# Patient Record
Sex: FEMALE | Race: OTHER | HISPANIC OR LATINO | URBAN - METROPOLITAN AREA
[De-identification: names, ages, dates, MRNs, and addresses within clinical notes are randomized per-mention and may not be internally consistent; named-entity substitution may affect disease eponyms.]

---

## 2023-08-15 ENCOUNTER — EMERGENCY (EMERGENCY)
Facility: HOSPITAL | Age: 23
LOS: 1 days | Discharge: HOME | End: 2023-08-15
Attending: EMERGENCY MEDICINE
Payer: SELF-PAY

## 2023-08-15 VITALS
RESPIRATION RATE: 20 BRPM | DIASTOLIC BLOOD PRESSURE: 69 MMHG | HEIGHT: 62 IN | TEMPERATURE: 98 F | HEART RATE: 64 BPM | OXYGEN SATURATION: 98 % | SYSTOLIC BLOOD PRESSURE: 123 MMHG | WEIGHT: 149.91 LBS

## 2023-08-15 LAB
APPEARANCE UR: ABNORMAL
BACTERIA # UR AUTO: ABNORMAL
BILIRUB UR-MCNC: NEGATIVE — SIGNIFICANT CHANGE UP
COLOR SPEC: SIGNIFICANT CHANGE UP
DIFF PNL FLD: ABNORMAL
EPI CELLS # UR: 3 — SIGNIFICANT CHANGE UP
GLUCOSE UR QL: NEGATIVE — SIGNIFICANT CHANGE UP
HCG UR QL: NEGATIVE — SIGNIFICANT CHANGE UP
HYALINE CASTS # UR AUTO: 0 /LPF — SIGNIFICANT CHANGE UP (ref 0–7)
KETONES UR-MCNC: NEGATIVE — SIGNIFICANT CHANGE UP
LEUKOCYTE ESTERASE UR-ACNC: NEGATIVE — SIGNIFICANT CHANGE UP
NITRITE UR-MCNC: NEGATIVE — SIGNIFICANT CHANGE UP
PH UR: 6 — SIGNIFICANT CHANGE UP (ref 5–8)
PROT UR-MCNC: ABNORMAL
RBC CASTS # UR COMP ASSIST: >50 /HPF — HIGH (ref 0–4)
SP GR SPEC: 1.02 — SIGNIFICANT CHANGE UP (ref 1.01–1.02)
UROBILINOGEN FLD QL: NEGATIVE — SIGNIFICANT CHANGE UP
WBC UR QL: 15 /HPF — HIGH (ref 0–5)

## 2023-08-15 PROCEDURE — 99284 EMERGENCY DEPT VISIT MOD MDM: CPT

## 2023-08-15 PROCEDURE — 81001 URINALYSIS AUTO W/SCOPE: CPT

## 2023-08-15 PROCEDURE — 87077 CULTURE AEROBIC IDENTIFY: CPT

## 2023-08-15 PROCEDURE — 99283 EMERGENCY DEPT VISIT LOW MDM: CPT

## 2023-08-15 PROCEDURE — 87186 SC STD MICRODIL/AGAR DIL: CPT

## 2023-08-15 PROCEDURE — 81025 URINE PREGNANCY TEST: CPT

## 2023-08-15 PROCEDURE — 87086 URINE CULTURE/COLONY COUNT: CPT

## 2023-08-15 RX ORDER — NITROFURANTOIN MACROCRYSTAL 50 MG
1 CAPSULE ORAL
Qty: 10 | Refills: 0
Start: 2023-08-15 | End: 2023-08-19

## 2023-08-15 RX ORDER — NITROFURANTOIN MACROCRYSTAL 50 MG
100 CAPSULE ORAL ONCE
Refills: 0 | Status: COMPLETED | OUTPATIENT
Start: 2023-08-15 | End: 2023-08-15

## 2023-08-15 RX ORDER — IBUPROFEN 200 MG
600 TABLET ORAL ONCE
Refills: 0 | Status: COMPLETED | OUTPATIENT
Start: 2023-08-15 | End: 2023-08-15

## 2023-08-15 RX ADMIN — Medication 100 MILLIGRAM(S): at 11:19

## 2023-08-15 RX ADMIN — Medication 600 MILLIGRAM(S): at 09:34

## 2023-08-15 NOTE — ED ADULT TRIAGE NOTE - AS PAIN REST
NRB mask applied.      Austin Durham RN  07/08/18 1848 0 (no pain/absence of nonverbal indicators of pain)

## 2023-08-15 NOTE — ED PROVIDER NOTE - NSFOLLOWUPINSTRUCTIONS_ED_ALL_ED_FT
See your Primary Doctor this week for follow up -- call to discuss.    Use Acetaminophen and/or Ibuprofen as directed for pain/fever -- see medication warnings.    Use NITROFURANTOIN as directed for urinary infection -- see medication warnings.    See URINARY TRACT INFECTION information and return instructions given to you.    Seek immediate medical care for new/worsening symptoms/concerns.

## 2023-08-15 NOTE — ED PROVIDER NOTE - PHYSICAL EXAMINATION
Vitals: I have reviewed the patients vital signs  General: Well dressed, well appearing, no acute distress  HEENT: Atraumatic, normocephalic, airway patent  Eyes: EOMI, tracking appropriately  Neck: no tracheal deviation, no JVD  Chest/Lungs: symmetric chest rise, speaking in complete sentences, CTA BL   Heart: skin and extremities well perfused, regular rate and rhythm  Abdomen: soft, nontender and nondistended, no flank tenderness   Neuro: A+Ox3, ambulating without difficulty, CN grossly intact  MSK: strength at baseline in all extremities, no muscle wasting or atrophy  Skin: no cyanosis, no jaundice, no new emergent lesions

## 2023-08-15 NOTE — ED PROVIDER NOTE - ATTENDING CONTRIBUTION TO CARE
------------ATTENDING NOTE------------  pt c/o < 24 hrs of increased urinary frequency/urgency and mild burning dysuria and noticing blood tinged urine, no fevers, no flank/back pain, no vaginal discharge, LMP just ended, no STI risks, overall well appearing, nontoxic, soft benign abd, no flank tenderness, awaiting UA -->  - Scott Cooney MD   ----------------------------------------------

## 2023-08-15 NOTE — ED PROVIDER NOTE - CLINICAL SUMMARY MEDICAL DECISION MAKING FREE TEXT BOX
see MD Note see MD Note    Andrew PGY3   Patient is a 22-year-old female with no past medical history or surgical history presents with acute onset of dysuria and hematuria. Patient has no risk factors for high risk sexual behavior. Likely acute cystitis. Low suspicion for pyelo given no fever/chills/CVA tenderness. Will send for UA/UC and Upreg.

## 2023-08-15 NOTE — ED PROVIDER NOTE - OBJECTIVE STATEMENT
Patient is a 22-year-old female with no past medical history or surgical history presents with acute onset of dysuria and hematuria.  Patient states that when she went to urinate this morning she started having burning sensation and noted blood-tinged urine in the toilet bowl. Currently endorses feeling little nauseous with some suprapubic discomfort.  Denies fever, chills, vomiting, abdominal pain, flank pain or any recent illness.  Denies high risk sexual behaviors. Finished her menstrual period yesterday. No prior history of kidney stone or UTI.

## 2023-08-15 NOTE — ED PROVIDER NOTE - PROGRESS NOTE DETAILS
Andrew PGY3  UA concerning for cystitis, will give macrobid and send home with macrobid. Upreg negative.

## 2023-08-15 NOTE — ED PROVIDER NOTE - PATIENT PORTAL LINK FT
You can access the FollowMyHealth Patient Portal offered by [facility] by registering at the following website: [email or web address]. By joining [employer]’s FollowMyHealth portal, you will also be able to view your health information using other applications (apps) compatible with our system.

## 2023-08-15 NOTE — ED PROVIDER NOTE - CROS ED ROS STATEMENT
all other ROS negative except as per HPI Oxybutynin Counseling:  I discussed with the patient the risks of oxybutynin including but not limited to skin rash, drowsiness, dry mouth, difficulty urinating, and blurred vision.

## 2023-08-15 NOTE — ED ADULT NURSE NOTE - OBJECTIVE STATEMENT
23 yo presents to the ED from home. A&Ox4, ambulatory c/o hematuria. pt reports painful urination and blood in urine since this AM. denies flank or abd pain. pt reports nausea, denies vomiting, diarrhea. denies fever, chills. MD at bedside for eval. plan of care discussed. safety and comfort measures maintained.

## 2023-08-18 RX ORDER — CEFDINIR 250 MG/5ML
1 POWDER, FOR SUSPENSION ORAL
Qty: 14 | Refills: 0
Start: 2023-08-18 | End: 2023-08-24

## 2023-08-18 NOTE — ED POST DISCHARGE NOTE - DETAILS
8/18/23 Armaan PA-pt is on Macrobid- indeterminate based on sensitivities. called patient to inform them of result, no answer, left v/m to call back admin line 8/18/23 Armaan ORTIZ pt called back. pt states she does feel nauseous today and has back pain. pt switched to cefdinir BID x 7 days. pt instructed that if she develops upper back pain, fevers, or any worsening then she should come back to the hospital. pt understands and agrees with plan. cefdinir sent to her pharmacy.

## 2023-09-25 PROBLEM — Z00.00 ENCOUNTER FOR PREVENTIVE HEALTH EXAMINATION: Status: ACTIVE | Noted: 2023-09-25

## 2023-10-20 ENCOUNTER — APPOINTMENT (OUTPATIENT)
Dept: INTERNAL MEDICINE | Facility: CLINIC | Age: 23
End: 2023-10-20

## 2023-11-02 NOTE — ED PROVIDER NOTE - CHIEF COMPLAINT
Seen on 10/19/2023 and the results were given to the pt    Follow-up        Abd pain is a little better but he still has diarrhea. He said that the GI office called him and that he will call them back          Praneeth Abbasi APRN - NP looked over your labs     You have a gall stone if it is causing you so many issues, I can refer to general surgery. You also have fatty liver and this can be changed by a healthy diet and exercise regimen   You also kidney stone measuring 6mm. Please let me know if you would like referrals to general surgery for the gallbladder and urology for the kidney stone.
The patient is a 22y Female complaining of hematuria.

## 2024-01-24 ENCOUNTER — APPOINTMENT (OUTPATIENT)
Dept: PODIATRY | Facility: CLINIC | Age: 24
End: 2024-01-24
Payer: OTHER GOVERNMENT

## 2024-01-24 DIAGNOSIS — F17.210 NICOTINE DEPENDENCE, CIGARETTES, UNCOMPLICATED: ICD-10-CM

## 2024-01-24 DIAGNOSIS — L03.032 CELLULITIS OF LEFT TOE: ICD-10-CM

## 2024-01-24 DIAGNOSIS — M79.675 PAIN IN LEFT TOE(S): ICD-10-CM

## 2024-01-24 DIAGNOSIS — L60.0 INGROWING NAIL: ICD-10-CM

## 2024-01-24 DIAGNOSIS — Z82.49 FAMILY HISTORY OF ISCHEMIC HEART DISEASE AND OTHER DISEASES OF THE CIRCULATORY SYSTEM: ICD-10-CM

## 2024-01-24 PROCEDURE — 10060 I&D ABSCESS SIMPLE/SINGLE: CPT | Mod: LT

## 2024-01-24 PROCEDURE — 99203 OFFICE O/P NEW LOW 30 MIN: CPT | Mod: 25

## 2024-01-26 PROBLEM — M79.675 PAIN OF TOE OF LEFT FOOT: Status: ACTIVE | Noted: 2024-01-25

## 2024-01-26 PROBLEM — L03.032 PARONYCHIA OF TOENAIL OF LEFT FOOT: Status: ACTIVE | Noted: 2024-01-25

## 2024-01-26 PROBLEM — L60.0 INGROWN NAIL: Status: ACTIVE | Noted: 2024-01-25

## 2024-01-26 NOTE — PHYSICAL EXAM
[Varicose Veins Of Lower Extremities] : not present [Ankle Swelling (On Exam)] : not present [FreeTextEntry3] : Hair growth noted on digits. Proximal to distal cooling is within normal limits.  [Delayed in the Right Toes] : capillary refills normal in right toes [Delayed in the Left Toes] : capillary refills normal in the left toes [FreeTextEntry1] : Left hallux fibular ingrown nail with paronychia. There is erythema and soupy, infectious drainage. It is very sensitive, 8/10 pain.

## 2024-01-26 NOTE — ASSESSMENT
[FreeTextEntry1] : Impression: Ingrown nail left hallux fibular border. Pain.  Treatment: The patient consented.  I prepped the area with alcohol. The patient was placed in the supine position where a digital block of 1% Xylocaine was administered to the left hallux consisting of 2cc, after prepping it with alcohol and using Ethyl Chloride.   A debridement tray was opened. The left hallux was then prepped and draped in the usual sterile manner.  Under strict sterile technique, the fibular border was freed from the surrounding nail folds.  Then utilizing an English anvil nail splitter, the nail border was removed straight back to and under the eponychium.  The remaining nail bed was inspected and noted to be free of any spicules.   Paronychia was incised and drained. I removed small granuloma. All infectious drainage was thoroughly drained. it bled well. It is nice and healthy and there is nothing to actually culture after the drainage.  Area was irrigated and cleansed with peroxide and saline.  A Neosporin dressing was applied.  Patient was given verbal and written soaking post-operative instructions.  She will use topical antibiosis. With any problem that persists she needs to immediately call the office.

## 2024-01-26 NOTE — HISTORY OF PRESENT ILLNESS
[FreeTextEntry1] : Patient presents today as a new patient because of an infected left hallux fibular ingrown nail with paronychia. It is very painful. Pain is 7/10. She can't even touch it herself. It is getting worse and going on for a week.

## 2024-05-24 ENCOUNTER — TRANSCRIPTION ENCOUNTER (OUTPATIENT)
Age: 24
End: 2024-05-24

## 2024-05-24 ENCOUNTER — APPOINTMENT (OUTPATIENT)
Dept: INTERNAL MEDICINE | Facility: CLINIC | Age: 24
End: 2024-05-24
Payer: OTHER GOVERNMENT

## 2024-05-24 VITALS
BODY MASS INDEX: 27.6 KG/M2 | WEIGHT: 150 LBS | HEART RATE: 67 BPM | TEMPERATURE: 98.4 F | RESPIRATION RATE: 14 BRPM | OXYGEN SATURATION: 97 % | SYSTOLIC BLOOD PRESSURE: 115 MMHG | DIASTOLIC BLOOD PRESSURE: 74 MMHG | HEIGHT: 62 IN

## 2024-05-24 DIAGNOSIS — Z00.00 ENCOUNTER FOR GENERAL ADULT MEDICAL EXAMINATION W/OUT ABNORMAL FINDINGS: ICD-10-CM

## 2024-05-24 DIAGNOSIS — Z82.61 FAMILY HISTORY OF ARTHRITIS: ICD-10-CM

## 2024-05-24 DIAGNOSIS — Z11.3 ENCOUNTER FOR SCREENING FOR INFECTIONS WITH A PREDOMINANTLY SEXUAL MODE OF TRANSMISSION: ICD-10-CM

## 2024-05-24 DIAGNOSIS — G43.909 MIGRAINE, UNSPECIFIED, NOT INTRACTABLE, W/OUT STATUS MIGRAINOSUS: ICD-10-CM

## 2024-05-24 DIAGNOSIS — Z82.0 FAMILY HISTORY OF EPILEPSY AND OTHER DISEASES OF THE NERVOUS SYSTEM: ICD-10-CM

## 2024-05-24 DIAGNOSIS — Z82.69 FAMILY HISTORY OF OTHER DISEASES OF THE MUSCULOSKELETAL SYSTEM AND CONNECTIVE TISSUE: ICD-10-CM

## 2024-05-24 DIAGNOSIS — N94.6 DYSMENORRHEA, UNSPECIFIED: ICD-10-CM

## 2024-05-24 DIAGNOSIS — Z82.49 FAMILY HISTORY OF ISCHEMIC HEART DISEASE AND OTHER DISEASES OF THE CIRCULATORY SYSTEM: ICD-10-CM

## 2024-05-24 PROCEDURE — G0444 DEPRESSION SCREEN ANNUAL: CPT | Mod: 59

## 2024-05-24 PROCEDURE — 99385 PREV VISIT NEW AGE 18-39: CPT

## 2024-05-24 RX ORDER — CALCIUM CARBONATE/VITAMIN D3 600 MG-10
TABLET ORAL
Refills: 0 | Status: ACTIVE | COMMUNITY

## 2024-05-24 RX ORDER — MAGNESIUM OXIDE/MAG AA CHELATE 300 MG
CAPSULE ORAL
Refills: 0 | Status: ACTIVE | COMMUNITY

## 2024-05-24 RX ORDER — MULTIVIT-MIN/IRON/FOLIC ACID/K 18-600-40
CAPSULE ORAL
Refills: 0 | Status: ACTIVE | COMMUNITY

## 2024-05-24 NOTE — PHYSICAL EXAM
[No Acute Distress] : no acute distress [Well Nourished] : well nourished [Well Developed] : well developed [Well-Appearing] : well-appearing [Normal Sclera/Conjunctiva] : normal sclera/conjunctiva [PERRL] : pupils equal round and reactive to light [EOMI] : extraocular movements intact [Normal Outer Ear/Nose] : the outer ears and nose were normal in appearance [Normal Oropharynx] : the oropharynx was normal [No JVD] : no jugular venous distention [No Lymphadenopathy] : no lymphadenopathy [Supple] : supple [Thyroid Normal, No Nodules] : the thyroid was normal and there were no nodules present [No Respiratory Distress] : no respiratory distress  [No Accessory Muscle Use] : no accessory muscle use [Clear to Auscultation] : lungs were clear to auscultation bilaterally [Normal Rate] : normal rate  [Regular Rhythm] : with a regular rhythm [Normal S1, S2] : normal S1 and S2 [Pedal Pulses Present] : the pedal pulses are present [No Edema] : there was no peripheral edema [Soft] : abdomen soft [Non Tender] : non-tender [Non-distended] : non-distended [No Masses] : no abdominal mass palpated [No HSM] : no HSM [Normal Bowel Sounds] : normal bowel sounds [Normal Posterior Cervical Nodes] : no posterior cervical lymphadenopathy [Normal Anterior Cervical Nodes] : no anterior cervical lymphadenopathy [No CVA Tenderness] : no CVA  tenderness [No Spinal Tenderness] : no spinal tenderness [No Joint Swelling] : no joint swelling [Grossly Normal Strength/Tone] : grossly normal strength/tone [No Rash] : no rash [Coordination Grossly Intact] : coordination grossly intact [No Focal Deficits] : no focal deficits [Normal Gait] : normal gait [Normal Affect] : the affect was normal [Normal Insight/Judgement] : insight and judgment were intact

## 2024-05-25 NOTE — ADDENDUM
[FreeTextEntry1] :   Documented by Boubacar Keane acting as a scribe for Dr. Denisse Jones. 05/24/2024   All medical record entries made by the Scribe were at my, Dr. Denisse Jones, direction and personally dictated by me on 05/24/2024. I have reviewed the chart and agree that the record accurately reflects my personal performance of the history, physical exam, assessment and plan. I have also personally directed, reviewed, and agreed with the chart.

## 2024-05-25 NOTE — HISTORY OF PRESENT ILLNESS
[de-identified] : Ms. LUIS CARLOS MON is a 23 year old female with no significant PMH presents to establish care and for a CPE.  Pt is c/o bleeding even after period has ended. She states that she constantly has spotting.  Pt is also concerned regarding a diagnosis of G6PD deficiency by the [employer], would like to have BW rechecked to see if it was an error.  Denies any exertional chest pain, dyspnea, palpitations, headaches, and dizziness.  Denies any F/C, N/V, leg swelling, or abdominal pain. She is otherwise fine and offers no additional complaints.

## 2024-05-25 NOTE — HEALTH RISK ASSESSMENT
[Fair] : ~his/her~ current health as fair  [Very Good] : ~his/her~  mood as very good [Yes] : Yes [0] : 2) Feeling down, depressed, or hopeless: Not at all (0) [PHQ-2 Negative - No further assessment needed] : PHQ-2 Negative - No further assessment needed [Patient reported PAP Smear was normal] : Patient reported PAP Smear was normal [Alone] : lives alone [# of Members in Household ___] :  household currently consist of [unfilled] member(s) [Employed] : employed [Student] : student [High School] : high school [Single] : single [# Of Children ___] : has [unfilled] children [Feels Safe at Home] : Feels safe at home [Never] : Never [de-identified] : Socially.  [de-identified] : Maintains active by running thirty minutes three times a week, exercising, calisthenics.  [PIM1Atval] : 0 [de-identified] : Admits her diet could be poor.  [de-identified] : Friend/roommate [PapSmearDate] : 2021 [de-identified] : Currently in collge

## 2024-05-31 ENCOUNTER — APPOINTMENT (OUTPATIENT)
Dept: INTERNAL MEDICINE | Facility: CLINIC | Age: 24
End: 2024-05-31
Payer: OTHER GOVERNMENT

## 2024-05-31 DIAGNOSIS — R79.89 OTHER SPECIFIED ABNORMAL FINDINGS OF BLOOD CHEMISTRY: ICD-10-CM

## 2024-05-31 LAB
25(OH)D3 SERPL-MCNC: 25.1 NG/ML
ALBUMIN SERPL ELPH-MCNC: 4.5 G/DL
ALP BLD-CCNC: 71 U/L
ALT SERPL-CCNC: 8 U/L
ANION GAP SERPL CALC-SCNC: 12 MMOL/L
APPEARANCE: CLEAR
AST SERPL-CCNC: 17 U/L
BILIRUB SERPL-MCNC: 0.9 MG/DL
BILIRUBIN URINE: NEGATIVE
BLOOD URINE: NEGATIVE
BUN SERPL-MCNC: 10 MG/DL
C TRACH RRNA SPEC QL NAA+PROBE: NOT DETECTED
CALCIUM SERPL-MCNC: 9.4 MG/DL
CHLORIDE SERPL-SCNC: 103 MMOL/L
CHOLEST SERPL-MCNC: 162 MG/DL
CO2 SERPL-SCNC: 24 MMOL/L
COLOR: YELLOW
CREAT SERPL-MCNC: 0.76 MG/DL
EGFR: 114 ML/MIN/1.73M2
G6PD SER-CCNC: 10.6 U/G HGB
GLUCOSE QUALITATIVE U: NEGATIVE MG/DL
GLUCOSE SERPL-MCNC: 74 MG/DL
HAV IGM SER QL: NONREACTIVE
HBV CORE IGM SER QL: NONREACTIVE
HBV SURFACE AB SER QL: ABNORMAL
HBV SURFACE AG SER QL: NONREACTIVE
HCV AB SER QL: NONREACTIVE
HCV S/CO RATIO: 0.1 S/CO
HDLC SERPL-MCNC: 60 MG/DL
HIV1+2 AB SPEC QL IA.RAPID: NONREACTIVE
HSV 1+2 IGG SER IA-IMP: NEGATIVE
HSV 1+2 IGG SER IA-IMP: NEGATIVE
HSV1 IGG SER QL: <0.01 INDEX
HSV2 IGG SER QL: 0.04 INDEX
KETONES URINE: NEGATIVE MG/DL
LDLC SERPL CALC-MCNC: 92 MG/DL
LEUKOCYTE ESTERASE URINE: NEGATIVE
N GONORRHOEA RRNA SPEC QL NAA+PROBE: NOT DETECTED
NITRITE URINE: NEGATIVE
NONHDLC SERPL-MCNC: 102 MG/DL
PH URINE: 6
POTASSIUM SERPL-SCNC: 4.1 MMOL/L
PROT SERPL-MCNC: 6.9 G/DL
PROTEIN URINE: NEGATIVE MG/DL
SODIUM SERPL-SCNC: 139 MMOL/L
SOURCE AMPLIFICATION: NORMAL
SOURCE AMPLIFICATION: NORMAL
SPECIFIC GRAVITY URINE: 1.03
T PALLIDUM AB SER QL IA: NEGATIVE
TRIGL SERPL-MCNC: 53 MG/DL
TSH SERPL-ACNC: 2.31 UIU/ML
UROBILINOGEN URINE: 1 MG/DL
VIT B12 SERPL-MCNC: 317 PG/ML

## 2024-05-31 PROCEDURE — 99213 OFFICE O/P EST LOW 20 MIN: CPT

## 2024-07-01 PROBLEM — R79.89 LOW VITAMIN D LEVEL: Status: ACTIVE | Noted: 2024-07-01

## 2024-07-01 NOTE — PLAN
[FreeTextEntry1] : See plan    Low Vit D Pt instructed to take OTC Vit D supplements  Lab results d/w pt

## 2024-07-01 NOTE — HISTORY OF PRESENT ILLNESS
[Home] : at home, [unfilled] , at the time of the visit. [Medical Office: ([facility])___] : at the medical office located in  [de-identified] : Ms. LUIS CARLOS MON is a 23 year old female with no significant PMH presents for a follow up via Telehealth.   Patient states that she is feeling well and is doing fine. She states that she has been trying to monitor her diet and exercise. Rceent bloodwork revealed low Vit D. Has not been taking OTC Vit D supplements. No fevers, h/a, hair loss, oral ulcers, epistaxis, sinusitis, swollen glands, dry eyes, CP, SOB, cough, vision changes, abdominal pain, GERD, n/v/d, blood in stool or urine, focal weakness, sensory loss, weight loss. She is otherwise fine and offers no additional complaints.

## 2024-08-29 ENCOUNTER — APPOINTMENT (OUTPATIENT)
Dept: PODIATRY | Facility: CLINIC | Age: 24
End: 2024-08-29

## 2024-11-18 ENCOUNTER — APPOINTMENT (OUTPATIENT)
Dept: PODIATRY | Facility: CLINIC | Age: 24
End: 2024-11-18

## 2025-02-22 ENCOUNTER — EMERGENCY (EMERGENCY)
Facility: HOSPITAL | Age: 25
LOS: 1 days | Discharge: HOME | End: 2025-02-22
Attending: STUDENT IN AN ORGANIZED HEALTH CARE EDUCATION/TRAINING PROGRAM
Payer: OTHER GOVERNMENT

## 2025-02-22 VITALS
HEART RATE: 61 BPM | DIASTOLIC BLOOD PRESSURE: 61 MMHG | OXYGEN SATURATION: 99 % | SYSTOLIC BLOOD PRESSURE: 110 MMHG | RESPIRATION RATE: 16 BRPM

## 2025-02-22 VITALS
RESPIRATION RATE: 16 BRPM | HEART RATE: 81 BPM | WEIGHT: 151.9 LBS | TEMPERATURE: 98 F | DIASTOLIC BLOOD PRESSURE: 80 MMHG | OXYGEN SATURATION: 97 % | SYSTOLIC BLOOD PRESSURE: 120 MMHG | HEIGHT: 62 IN

## 2025-02-22 PROCEDURE — 99284 EMERGENCY DEPT VISIT MOD MDM: CPT

## 2025-02-22 PROCEDURE — 99283 EMERGENCY DEPT VISIT LOW MDM: CPT

## 2025-02-22 PROCEDURE — 99053 MED SERV 10PM-8AM 24 HR FAC: CPT

## 2025-02-22 RX ORDER — IBUPROFEN 600 MG/1
400 TABLET, FILM COATED ORAL ONCE
Refills: 0 | Status: COMPLETED | OUTPATIENT
Start: 2025-02-22 | End: 2025-02-22

## 2025-02-22 RX ORDER — LIDOCAINE HYDROCHLORIDE 30 MG/G
1 CREAM TOPICAL ONCE
Refills: 0 | Status: COMPLETED | OUTPATIENT
Start: 2025-02-22 | End: 2025-02-22

## 2025-02-22 RX ADMIN — IBUPROFEN 400 MILLIGRAM(S): 600 TABLET, FILM COATED ORAL at 06:39

## 2025-02-22 RX ADMIN — LIDOCAINE HYDROCHLORIDE 1 PATCH: 30 CREAM TOPICAL at 06:39

## 2025-02-22 RX ADMIN — Medication 10 MILLIGRAM(S): at 06:39

## 2025-02-22 NOTE — ED PROVIDER NOTE - ATTENDING CONTRIBUTION TO CARE
I was the supervising attending. I have independently seen face-to-face and examined the patient with the resident. I have reviewed the history and physical and discussed the MDM with the resident. I agree with the assessment and plan as presented unless otherwise documented as follows:    24F no PMH presenting w/ back pain. Present since yesterday, states she was seated in a chair when she noted sudden onset of L lower back pain upon standing up. States pain radiates down L buttock and around L thigh. Has never had similar symptoms before. Endorses constant pain since onset that is progressively worsening, worse with certain movements e.g. fully bending over and improved by sitting/slightly leaning forward and lying down. Otherwise denies recent injuries/falls, heavy lifting/exertion, leg numbness/tingling, weakness, urinary retention/fecal incontinence, fevers/chills, nausea/vomiting, abdominal pain, chest pain, SOB. Appears well, AOx3, not in acute distress. Lungs CTABL, normal heart sounds, abdomen soft and NT/ND. Mild L paralumbar & buttock TTP, no midline TTP or step-off/deformity. No rash to back. Positive SLR L>R. 5/5 strength all 4 extremities w/ intact sensation. Exam most c/w MSK/sciatic back pain, presentation not suggestive of acute cord pathology. Will reassess after pain medication/muscle relaxant. -Stacie Cota MD (Attending)

## 2025-02-22 NOTE — ED PROVIDER NOTE - OBJECTIVE STATEMENT
24-year-old female with no significant past medical history presents emergency department due to 1 day of left-sided lower back pain.  Patient states she was sitting and then stood up and started having pain in her lower back that radiated down to her left leg.  Patient has not taken anything for the pain.  States that is difficult for her to bend and has worsening pain with different movements.  Says she cannot bend over fully.  Denies fever, chills, nausea, vomiting, chest pain, shortness of breath, abdominal pain, urinary frequency, dysuria, bowel incontinence, constipation, diarrhea.  No trauma or heavy lifting.

## 2025-02-22 NOTE — ED PROVIDER NOTE - PHYSICAL EXAMINATION
Gen: No acute distress  HEENT: EOMI, No midline tenderness.   CV: RRR, +S1/S2, no M/R/G, 2+ radial pulses b/l  Resp: CTAB, no W/R/R, no accessory muscle use, no increased work of breathing  GI: Abdomen soft non-distended, NTTP  MSK: Left-sided lower back pain.  Sensations intact bilaterally in the lower extremities.  Patient has mild weakness of the left lower extremity due to pain.  Able to ambulate.  Positive straight leg test on the left side.  No midline tenderness.  No bruising or deformities noted.  Neuro: A&Ox3, following commands, moving all four extremities spontaneously  Psych: appropriate mood

## 2025-02-22 NOTE — ED PROVIDER NOTE - PATIENT PORTAL LINK FT
You can access the FollowMyHealth Patient Portal offered by [facility] by registering at the following website: [email or web address]. By joining [employer]’s FollowMyHealth portal, you will also be able to view your health information using other applications (apps) compatible with our system.

## 2025-02-22 NOTE — ED PROVIDER NOTE - NSFOLLOWUPINSTRUCTIONS_ED_ALL_ED_FT
You were evaluated in the Emergency Department today for your back pain. Your evaluation did not show signs of medical conditions requiring emergent intervention at this time.    You should follow up with the SPORTS MEDICINE clinic to further evaluate and manage your symptoms. I have listed their information on the first page of the discharge paperwork. Please call to make an appointment.    UNLESS OTHERWISE DIRECTED BY YOUR PRIMARY DOCTOR, for discomfort at home, you can alternate between 600mg ibuprofen (Motrin, Alleve, Advil, etc.) and 650-975mg acetaminophen (Tylenol) every 6-8 hours. If your pain is severe, you can take them both together at the same time. Please do not exceed 3200mg ibuprofen or 4000mg Tylenol in one day. Please consult with your primary doctor before taking any medications on a regular basis.    Please schedule an appointment for follow-up with your primary care physician this week for further evaluation of your symptoms.    Return to the Emergency Department if you experience worsening back pain, difficulty walking, fevers, numbness, tingling, incontinence, or any other concerning symptoms. You were evaluated in the Emergency Department today for your back pain. Your evaluation did not show signs of medical conditions requiring emergent intervention at this time.    You should follow up with the SPORTS MEDICINE clinic to further evaluate and manage your symptoms. I have listed their information on the first page of the discharge paperwork. Please call to make an appointment.    UNLESS OTHERWISE DIRECTED BY YOUR PRIMARY DOCTOR, for discomfort at home, you can alternate between 600mg ibuprofen (Motrin, Alleve, Advil, etc.) and 650-975mg acetaminophen (Tylenol) every 6-8 hours. If your pain is severe, you can take them both together at the same time. Please do not exceed 3200mg ibuprofen or 4000mg Tylenol in one day. Please consult with your primary doctor before taking any medications on a regular basis.    You can also purchase lidocaine patches at any [facility], [facility], [facility], etc. to place on the area of pain.  Please schedule an appointment for follow-up with your primary care physician this week for further evaluation of your symptoms.    Return to the Emergency Department if you experience worsening back pain, difficulty walking, fevers, numbness, tingling, incontinence, or any other concerning symptoms.

## 2025-02-22 NOTE — ED ADULT TRIAGE NOTE - CHIEF COMPLAINT QUOTE
pt c/o L lower back pain since yesterday, was sitting in a chair and when she stood up developed this pain, pain is constant and worse with movement   pt states "I can barely bend over due to pain"  denies any injury/trauma

## 2025-02-22 NOTE — ED ADULT NURSE NOTE - OBJECTIVE STATEMENT
25 y/o F A&Ox4 with no significant PMH presents to the ED c/o back pain. Pt reports L lower back pain since yesterday; endorses pain radiates down her leg; denies numbness or tingling. Pt reports pain came on suddenly while she was sitting and endorses worsening pain when bending over. Denies taking pain medications prior to arrival to [facility] ED. Denies chest pain, SOB, n/v/d, lightheadedness, dizziness, fever, chills. Patient safety maintained, bed is in lowest position, wheels locked, and side rails raised. 23 y/o F A&Ox4 with no significant PMH presents to the ED c/o back pain. Pt reports L lower back pain since yesterday; endorses pain radiates down her leg; denies numbness or tingling. Pt reports pain came on suddenly while she was sitting and endorses worsening pain when bending over. Denies recent falls or trauma. Denies taking pain medications prior to arrival to [facility] ED. Denies chest pain, SOB, n/v/d, lightheadedness, dizziness, fever, chills. Patient safety maintained, bed is in lowest position, wheels locked, and side rails raised.

## 2025-02-22 NOTE — ED PROVIDER NOTE - CLINICAL SUMMARY MEDICAL DECISION MAKING FREE TEXT BOX
24-year-old female with no significant past medical history presents emerged apartment due to 1 day of left-sided lower back pain.  Hemodynamically stable and afebrile here in the emergency department upon my evaluation.  Patient has a positive left-sided straight leg test.  No midline tenderness but left lower lumbar muscular tenderness.  Sensations intact in bilateral lower extremities.  Low suspicion for emergent conditions to the spine including hemarthrosis, cord compression, cauda equina, epidural abscess.  Will check urine pregnancy.  Will treat symptomatically here in the emergency department with ibuprofen, Flexeril, lidocaine patch.

## 2025-02-22 NOTE — ED PROVIDER NOTE - PROGRESS NOTE DETAILS
EM PGY-2/Moody, DO: 25 y/o F presenting with back pain consistent with L sciatica. Bearing weight. Given meds, will reeval, anticipate dispo with sports med clinic f/u. PGY-2/DO Moody: Patient signed out to me at shift change. 25 y/o F presenting with back pain consistent with L sciatica. Bearing weight. Given meds, will reeval, anticipate dispo with sports med clinic f/u. EM PGY-2/Moody DO: Seen and examined at bedside. Pt able to ambulate, sts is ready to go home. have discussed all results, discharge instructions, strict return precautions and need for follow up with patient. They have verbalized understanding and agreement to plan at this time. Amenable to discharge.

## 2025-02-22 NOTE — ED PROVIDER NOTE - NSFOLLOWUPCLINICS_GEN_ALL_ED_FT
[facility] Sports Medicine  Sports Medicine  [address] [place] [address]  Phone: [phone]  Fax:   Follow Up Time: 7-10 Days

## 2025-02-22 NOTE — ED PROVIDER NOTE - ADDITIONAL NOTES AND INSTRUCTIONS:
Please excuse patient from any school or work related activities as they were seen and treated here at [facility] Emergency Department.  Please do not hesitate to call if you have any questions or concerns.

## 2025-08-23 ENCOUNTER — NON-APPOINTMENT (OUTPATIENT)
Age: 25
End: 2025-08-23